# Patient Record
Sex: MALE | Race: WHITE | Employment: FULL TIME | URBAN - METROPOLITAN AREA
[De-identification: names, ages, dates, MRNs, and addresses within clinical notes are randomized per-mention and may not be internally consistent; named-entity substitution may affect disease eponyms.]

---

## 2020-01-29 ENCOUNTER — APPOINTMENT (OUTPATIENT)
Dept: GENERAL RADIOLOGY | Age: 40
End: 2020-01-29
Attending: EMERGENCY MEDICINE
Payer: COMMERCIAL

## 2020-01-29 ENCOUNTER — HOSPITAL ENCOUNTER (EMERGENCY)
Age: 40
Discharge: HOME | End: 2020-01-29
Attending: EMERGENCY MEDICINE
Payer: COMMERCIAL

## 2020-01-29 VITALS
RESPIRATION RATE: 16 BRPM | HEART RATE: 68 BPM | TEMPERATURE: 98.7 F | OXYGEN SATURATION: 98 % | DIASTOLIC BLOOD PRESSURE: 91 MMHG | SYSTOLIC BLOOD PRESSURE: 143 MMHG

## 2020-01-29 DIAGNOSIS — S60.222A CONTUSION OF LEFT HAND, INITIAL ENCOUNTER: Primary | ICD-10-CM

## 2020-01-29 PROCEDURE — 99282 EMERGENCY DEPT VISIT SF MDM: CPT

## 2020-01-29 PROCEDURE — 73130 X-RAY EXAM OF HAND: CPT

## 2020-01-29 NOTE — DISCHARGE INSTRUCTIONS
Patient Education        Hand Bruises: Care Instructions  Your Care Instructions  Bruises, or contusions, can happen as a result of an impact or fall. Most people think of a bruise as a black-and-blue spot. This happens when small blood vessels get torn and leak blood under the skin. The bruise may turn purplish black, reddish blue, or yellowish green as it heals. But bones and muscles can also get bruised. This may damage the hand but not cause a bruise that you can see. Most bruises aren't serious and will go away on their own in 2 to 4 weeks. But sometimes a more serious hand injury might not heal on its own. Tell your doctor if you have new symptoms or your injury is not getting better over time. You may have tests to see if you have bone or nerve damage. These tests may include X-rays, a CT scan, or an MRI. If you damaged bones or muscles, you may need more treatment. The doctor has checked you carefully, but problems can develop later. If you notice any problems or new symptoms, get medical treatment right away. Follow-up care is a key part of your treatment and safety. Be sure to make and go to all appointments, and call your doctor if you are having problems. It's also a good idea to know your test results and keep a list of the medicines you take. How can you care for yourself at home? · Put ice or a cold pack on the hand for 10 to 20 minutes at a time. Put a thin cloth between the ice and your skin. · Prop up your hand on a pillow when you ice it or anytime you sit or lie down during the next 3 days. Try to keep your hand above the level of your heart. This will help reduce swelling. · Be safe with medicines. Read and follow all instructions on the label. ? If the doctor gave you a prescription medicine for pain, take it as prescribed. ? If you are not taking a prescription pain medicine, ask your doctor if you can take an over-the-counter medicine.   · Be sure to follow your doctor's advice about moving and exercising your injured hand. When should you call for help? Call your doctor now or seek immediate medical care if:    · Your pain gets worse.     · You have new or worse swelling.     · You have tingling, weakness, or numbness in the area near the bruise.     · The area near the bruise is cold or pale.     · You have symptoms of infection, such as:  ? Increased pain, swelling, warmth, or redness. ? Red streaks leading from the area. ? Pus draining from the area. ? A fever.    Watch closely for changes in your health, and be sure to contact your doctor if:    · You do not get better as expected. Where can you learn more? Go to http://susi-chris.info/. Enter E104 in the search box to learn more about \"Hand Bruises: Care Instructions. \"  Current as of: June 26, 2019  Content Version: 12.2  © 1633-3023 [employer]. Care instructions adapted under license by [employer] (which disclaims liability or warranty for this information). If you have questions about a medical condition or this instruction, always ask your healthcare professional. [place] [address] any warranty or liability for your use of this information.

## 2020-01-29 NOTE — ED NOTES
Navdeep Ga is a 37 y.o. male that was discharged in stable condition. The patients diagnosis, condition and treatment were explained to  patient and aftercare instructions were given. The patient verbalized understanding. Patient armband removed and shredded.

## 2020-01-29 NOTE — LETTER
NOTIFICATION RETURN TO WORK / SCHOOL 
 
2/28/2020 9:38 AM 
 
Mr. Harrison Arita [address] [place] [address] To Whom It May Concern: 
 
Harrison Arita is currently under the care of [id number] [place] [address] EMERGENCY DEPT. He will return to work/school on: 01/29/2020 without restrictions, to full duty. If there are questions or concerns please have the patient contact our office.  
 
 
 
Sincerely, 
 
 
Chayito Youssef RN

## 2020-01-29 NOTE — LETTER
[address] [place] [address] [facility] 
[address] [facility] [id number] [phone] Work/School Note Date: 1/29/2020 To Whom It May concern: 
 
Kajal Dickey was seen and treated today in the emergency room by the following provider(s): 
Attending Provider: Sami Stevenson DO Physician Assistant: Tre De Oliveira PA-C. Kajal Dickey may return to work on 1/31/20. Sincerely, Liz Denney PA-C

## 2020-01-29 NOTE — ED PROVIDER NOTES
EMERGENCY DEPARTMENT HISTORY AND PHYSICAL EXAM    10:31 AM      Date: 1/29/2020  Patient Name: Chula Cuevas    History of Presenting Illness     Chief Complaint   Patient presents with    Hand Pain         History Provided By: Patient    Additional History (Context): Chula Cuevas is a 37 y.o. male with No significant past medical history who presents with complaints of hand pain after he jammed his hand between handrails yesterday while at work. Patient states he is a  and was carrying a box of the stairs when he jammed his hand against the corner of a handrail. Patient states he has had some swelling of the left hand and intermittent pain over the last day. Patient denies any weakness or loss of sensation. PCP: None        Past History     Past Medical History:  History reviewed. No pertinent past medical history. Past Surgical History:  History reviewed. No pertinent surgical history. Family History:  History reviewed. No pertinent family history. Social History:  Social History     Tobacco Use    Smoking status: Not on file   Substance Use Topics    Alcohol use: Not on file    Drug use: Not on file       Allergies:  No Known Allergies      Review of Systems       Review of Systems   HENT: Negative. Respiratory: Negative. Cardiovascular: Negative. Gastrointestinal: Negative. Genitourinary: Negative. Musculoskeletal: Positive for arthralgias and joint swelling. Neurological: Negative. All other systems reviewed and are negative. Physical Exam     Visit Vitals  BP (!) 143/91 (BP 1 Location: Right arm, BP Patient Position: At rest)   Pulse 68   Temp 98.7 °F (37.1 °C)   Resp 16   SpO2 98%         Physical Exam  Vitals signs reviewed. Constitutional:       General: He is not in acute distress. Appearance: Normal appearance. He is normal weight. He is not ill-appearing, toxic-appearing or diaphoretic. HENT:      Head: Normocephalic and atraumatic. Right Ear: External ear normal.      Left Ear: External ear normal.   Eyes:      Extraocular Movements: Extraocular movements intact. Neck:      Musculoskeletal: Neck supple. Cardiovascular:      Rate and Rhythm: Normal rate and regular rhythm. Pulses: Normal pulses. Heart sounds: No murmur. No gallop. Pulmonary:      Effort: Pulmonary effort is normal.      Breath sounds: Normal breath sounds. No wheezing, rhonchi or rales. Musculoskeletal: Normal range of motion. Left hand: He exhibits tenderness and swelling. He exhibits normal range of motion and no deformity. Comments: There is mild soft tissue edema of the dorsal aspect of the left hand. There are superficial abrasions to the dorsal aspect of the left hand. Patient has full range of motion of all 5 fingers of the left hand, full range of motion of the wrist.  Patient has full range of motion of the left elbow. Skin:     General: Skin is warm and dry. Capillary Refill: Capillary refill takes less than 2 seconds. Neurological:      General: No focal deficit present. Mental Status: He is alert and oriented to person, place, and time. Cranial Nerves: No cranial nerve deficit. Sensory: No sensory deficit. Motor: No weakness. Diagnostic Study Results     Labs -  No results found for this or any previous visit (from the past 12 hour(s)). Radiologic Studies -   XR HAND LT MIN 3 V   Final Result   IMPRESSION: No definite acute displaced fracture. Medical Decision Making   I am the first provider for this patient. I reviewed the vital signs, available nursing notes, past medical history, past surgical history, family history and social history. Vital Signs-Reviewed the patient's vital signs.     Records Reviewed: Nursing Notes and Old Medical Records (Time of Review: 10:31 AM)    ED Course: Progress Notes, Reevaluation, and Consults:  10:31 AM met with patient, reviewed history, performed physical exam.  Physical exam reveals mild soft tissue edema to the dorsal aspect of the left hand. Patient has full range of motion of the left hand and left wrist.  Radiographs obtained in the emergency department are negative for acute fracture. Provider Notes (Medical Decision Making):   42-year-old male seen in the emergency department complaints of hand pain after injury while at work. Patient has full range of motion of the left hand, pulses are intact bilaterally. Radiographs obtained in the emergency department are negative for acute fracture. Patient advised to follow-up with his primary care provider soon as possible for reassessment. Patient advised to return to the emergency department if pain persist for repeat x-rays. Patient advised to return immediately to the emergency department if symptoms worsen. Diagnosis     Clinical Impression:   1. Contusion of left hand, initial encounter        Disposition: home     Follow-up Information     Follow up With Specialties Details Why [id number] [facility] Call in 1 day For follow up regarding ER visit. [address] EMERGENCY DEPT Emergency Medicine  Immediately if symptoms worsen. [address] [facility]  [phone]           Patient's Medications    No medications on file     Niecy Peralta PA-C    Dictation disclaimer:  Please note that this dictation was completed with [employer], the computer voice recognition software. Quite often unanticipated grammatical, syntax, homophones, and other interpretive errors are inadvertently transcribed by the computer software. Please disregard these errors. Please excuse any errors that have escaped final proofreading.

## 2020-01-31 ENCOUNTER — HOSPITAL ENCOUNTER (EMERGENCY)
Age: 40
Discharge: STILL ADMITTED OR ADMINISTRATIVE | End: 2020-01-31
Attending: EMERGENCY MEDICINE